# Patient Record
Sex: MALE | Race: BLACK OR AFRICAN AMERICAN | Employment: FULL TIME | URBAN - METROPOLITAN AREA
[De-identification: names, ages, dates, MRNs, and addresses within clinical notes are randomized per-mention and may not be internally consistent; named-entity substitution may affect disease eponyms.]

---

## 2017-01-11 ENCOUNTER — OFFICE VISIT (OUTPATIENT)
Dept: PRIMARY CARE CLINIC | Age: 42
End: 2017-01-11

## 2017-01-11 VITALS
SYSTOLIC BLOOD PRESSURE: 120 MMHG | DIASTOLIC BLOOD PRESSURE: 72 MMHG | HEIGHT: 73 IN | HEART RATE: 64 BPM | BODY MASS INDEX: 28.73 KG/M2 | WEIGHT: 216.8 LBS

## 2017-01-11 DIAGNOSIS — R20.0 NUMBNESS IN LEFT LEG: Primary | ICD-10-CM

## 2017-01-11 DIAGNOSIS — F17.200 TOBACCO DEPENDENCE: ICD-10-CM

## 2017-01-11 DIAGNOSIS — Z00.00 HEALTH CARE MAINTENANCE: ICD-10-CM

## 2017-01-11 PROCEDURE — 99203 OFFICE O/P NEW LOW 30 MIN: CPT | Performed by: INTERNAL MEDICINE

## 2017-01-11 ASSESSMENT — ENCOUNTER SYMPTOMS
CHEST TIGHTNESS: 0
DIARRHEA: 0
ABDOMINAL PAIN: 0
NAUSEA: 0
VOMITING: 0
SORE THROAT: 0
COUGH: 0
EYE REDNESS: 0
EYE ITCHING: 0
CONSTIPATION: 0
WHEEZING: 0
BACK PAIN: 0

## 2018-09-27 PROBLEM — Z00.00 HEALTH CARE MAINTENANCE: Status: RESOLVED | Noted: 2017-01-11 | Resolved: 2018-09-27

## 2020-08-24 ENCOUNTER — HOSPITAL ENCOUNTER (EMERGENCY)
Age: 45
Discharge: HOME | End: 2020-08-24
Attending: EMERGENCY MEDICINE

## 2020-08-24 VITALS
BODY MASS INDEX: 26.95 KG/M2 | DIASTOLIC BLOOD PRESSURE: 88 MMHG | HEIGHT: 74 IN | OXYGEN SATURATION: 99 % | RESPIRATION RATE: 12 BRPM | WEIGHT: 210 LBS | SYSTOLIC BLOOD PRESSURE: 144 MMHG | HEART RATE: 64 BPM | TEMPERATURE: 98.5 F

## 2020-08-24 PROCEDURE — 99283 EMERGENCY DEPT VISIT LOW MDM: CPT

## 2020-08-24 PROCEDURE — 6370000000 HC RX 637 (ALT 250 FOR IP): Performed by: EMERGENCY MEDICINE

## 2020-08-24 RX ORDER — DIPHENHYDRAMINE HCL 25 MG
25 CAPSULE ORAL EVERY 6 HOURS PRN
Qty: 30 CAPSULE | Refills: 0 | Status: SHIPPED | OUTPATIENT
Start: 2020-08-24 | End: 2020-09-03

## 2020-08-24 RX ORDER — PREDNISONE 20 MG/1
60 TABLET ORAL DAILY
Qty: 12 TABLET | Refills: 0 | Status: SHIPPED | OUTPATIENT
Start: 2020-08-24 | End: 2020-08-28

## 2020-08-24 RX ORDER — PREDNISONE 20 MG/1
60 TABLET ORAL ONCE
Status: COMPLETED | OUTPATIENT
Start: 2020-08-24 | End: 2020-08-24

## 2020-08-24 RX ORDER — DIPHENHYDRAMINE HCL 25 MG
25 TABLET ORAL ONCE
Status: COMPLETED | OUTPATIENT
Start: 2020-08-24 | End: 2020-08-24

## 2020-08-24 RX ADMIN — PREDNISONE 60 MG: 20 TABLET ORAL at 21:15

## 2020-08-24 RX ADMIN — DIPHENHYDRAMINE HCL 25 MG: 25 TABLET ORAL at 21:15

## 2020-08-25 NOTE — ED NOTES
Pt dc/d with instructions and rx's in stable condition, ambulatory to lobby. Home per ride.       Jaci Myers RN  08/24/20 8519

## 2020-08-25 NOTE — ED PROVIDER NOTES
of education: Not on file    Highest education level: Not on file   Occupational History    Not on file   Social Needs    Financial resource strain: Not on file    Food insecurity     Worry: Not on file     Inability: Not on file    Transportation needs     Medical: Not on file     Non-medical: Not on file   Tobacco Use    Smoking status: Current Some Day Smoker     Years: 5.00     Types: Cigars    Smokeless tobacco: Never Used    Tobacco comment: 3-4 black and mild daily   Substance and Sexual Activity    Alcohol use: Yes     Comment: 6-7 shots a day    Drug use: Yes     Types: Marijuana     Comment: Jameson Hernandez daily    Sexual activity: Not on file   Lifestyle    Physical activity     Days per week: Not on file     Minutes per session: Not on file    Stress: Not on file   Relationships    Social connections     Talks on phone: Not on file     Gets together: Not on file     Attends Rastafari service: Not on file     Active member of club or organization: Not on file     Attends meetings of clubs or organizations: Not on file     Relationship status: Not on file    Intimate partner violence     Fear of current or ex partner: Not on file     Emotionally abused: Not on file     Physically abused: Not on file     Forced sexual activity: Not on file   Other Topics Concern    Not on file   Social History Narrative    Not on file       Nursing Notes Reviewed      ROS:  General: no fever  ENT: no sinus congestion, no sore throat  RESP: no cough, no shortness of breath  GI: no abdominal pain, no vomiting, no diarrhea  Musculoskeletal: no arthralgia, no myalgia, no back pain, no joint swelling  NEURO: no headache, no numbness, no weakness  DERM: + rash, no erythema, no ecchymosis, no wounds      PHYSICAL EXAM:  GENERAL APPEARANCE: Justine Benavides is in no acute respiratory distress. Awake and alert.   VITAL SIGNS:   ED Triage Vitals [08/24/20 2037]   Enc Vitals Group      BP (!) 146/97      Pulse 64      Resp 12      Temp 98.5 °F (36.9 °C)      Temp Source Oral      SpO2 99 %      Weight 210 lb (95.3 kg)      Height 6' 2\" (1.88 m)      Head Circumference       Peak Flow       Pain Score       Pain Loc       Pain Edu? Excl. in [address]? HEAD: Normocephalic, atraumatic. EYES:  Extraocular muscles are intact. Conjunctivas are pink. Negative scleral icterus. ENT:  Mucous membranes are moist.  Pharynx without erythema or exudates. NECK: Nontender and supple. CHEST: Clear to auscultation bilaterally. No rales, rhonchi, or wheezing. HEART:  Regular rate and rhythm. No murmurs. Strong and equal pulses in the upper and lower extremities. ABDOMEN: Soft,  nondistended, positive bowel sounds. abdomen is nontender. ; christopher Gil RN. Circumsized. No urethral discharge. No glans swelling. Foreskin edematous but loose. Not indurated. No erythema or warmth. Shaft is not tender. No testicular swelling or tenderness. No exudate around foreskin  MUSCULOSKELETAL:  Active range of motion of the upper and lower extremities. No edema. NEUROLOGICAL: Awake, alert and oriented x 3. Power intact in the upper and lower extremities. DERMATOLOGIC: No petechiae, rashes, or ecchymoses. Few small bumps on arms with no erythema or vesicles or pustules      ED COURSE AND MEDICAL DECISION MAKING:        Treatment in the department:  Patient received   Medications   diphenhydrAMINE (BENADRYL) tablet 25 mg (has no administration in time range)   predniSONE (DELTASONE) tablet 60 mg (has no administration in time range)       Medical decision making:  Patient presents emergency department with possible bug bites. He is describing bumps on his arms however on exam there is no definite appreciable stings or mosquito bites. There is some irregularity to the contour of the skin but no erythema. His foreskin however is edematous. It does not look infected. There is no constriction on the glans. No vesicles or pustules.   While this could be a reaction to some sort of sting or insect bite and allergic reaction is also possible. Patient be treated with antihistamines and steroids. I estimate there is LOW risk for CELLULITIS,  NECROTIZING FASCIITIS, INFECTIOUS BALANITIS, DEEP SPACE INFECTION, WRAY JEREL SYNDROME, ANAPHYLAXIS, SEPSIS, thus I consider the discharge disposition reasonable. Obie Aragon and I have discussed the diagnosis and risks, and we agree with discharging home to follow-up with their primary doctor. We also discussed returning to the Emergency Department immediately if new or worsening symptoms occur. We have discussed the symptoms which are most concerning (e.g., changing or worsening pain, fever, numbness, weakness, cool or painful digits) that necessitate immediate return      Clinical Impression:  1. Foreskin inflammation    2. Insect bites and stings, initial encounter        Dispo:  Patient will be discharged  at this time. Patient was informed of this decision and agrees with plan. I have discussed lab and xray findings with patient and they understand. Questions were answered to the best of my ability. Discharge vitals:  Blood pressure (!) 146/97, pulse 64, temperature 98.5 °F (36.9 °C), temperature source Oral, resp. rate 12, height 6' 2\" (1.88 m), weight 210 lb (95.3 kg), SpO2 99 %. Prescriptions given:   New Prescriptions    DIPHENHYDRAMINE (BENADRYL) 25 MG CAPSULE    Take 1 capsule by mouth every 6 hours as needed for Itching or Allergies    PREDNISONE (DELTASONE) 20 MG TABLET    Take 3 tablets by mouth daily for 4 days         This chart was created using dragon voice recognition software.         Tammy Patterson MD  08/24/20 4921

## 2020-08-25 NOTE — ED NOTES
Pt to ed with c/o swelling to his penis, below glans, poss bug bite, states had poss  multiple sm bites to arms today with itching.  Exam per MD.      Nai Samano RN  08/24/20 5247

## 2021-03-11 ENCOUNTER — HOSPITAL ENCOUNTER (EMERGENCY)
Age: 46
Discharge: HOME | End: 2021-03-11
Attending: EMERGENCY MEDICINE

## 2021-03-11 VITALS
SYSTOLIC BLOOD PRESSURE: 140 MMHG | OXYGEN SATURATION: 99 % | HEART RATE: 73 BPM | RESPIRATION RATE: 14 BRPM | WEIGHT: 203 LBS | BODY MASS INDEX: 26.05 KG/M2 | TEMPERATURE: 98.2 F | HEIGHT: 74 IN | DIASTOLIC BLOOD PRESSURE: 90 MMHG

## 2021-03-11 DIAGNOSIS — L50.9 URTICARIA: Primary | ICD-10-CM

## 2021-03-11 PROCEDURE — 6370000000 HC RX 637 (ALT 250 FOR IP): Performed by: EMERGENCY MEDICINE

## 2021-03-11 PROCEDURE — 99284 EMERGENCY DEPT VISIT MOD MDM: CPT

## 2021-03-11 RX ORDER — DIPHENHYDRAMINE HCL 25 MG
25 TABLET ORAL ONCE
Status: COMPLETED | OUTPATIENT
Start: 2021-03-11 | End: 2021-03-11

## 2021-03-11 RX ORDER — DIPHENHYDRAMINE HCL 25 MG
25 CAPSULE ORAL EVERY 6 HOURS PRN
Qty: 30 CAPSULE | Refills: 0 | Status: SHIPPED | OUTPATIENT
Start: 2021-03-11 | End: 2021-03-21

## 2021-03-11 RX ORDER — PREDNISONE 20 MG/1
60 TABLET ORAL ONCE
Status: COMPLETED | OUTPATIENT
Start: 2021-03-11 | End: 2021-03-11

## 2021-03-11 RX ORDER — PREDNISONE 10 MG/1
TABLET ORAL
Qty: 30 TABLET | Refills: 0 | Status: SHIPPED | OUTPATIENT
Start: 2021-03-11 | End: 2021-03-21

## 2021-03-11 RX ADMIN — DIPHENHYDRAMINE HYDROCHLORIDE 25 MG: 25 TABLET ORAL at 17:52

## 2021-03-11 RX ADMIN — PREDNISONE 60 MG: 20 TABLET ORAL at 17:52

## 2021-03-11 ASSESSMENT — ENCOUNTER SYMPTOMS
WHEEZING: 0
SHORTNESS OF BREATH: 0
VOICE CHANGE: 0
TROUBLE SWALLOWING: 0

## 2021-03-11 NOTE — ED PROVIDER NOTES
[address] [facility]  eMERGENCY dEPARTMENT eNCOUnter      Pt Name: Collette Spoon  MRN: [id number]  [place] 1975  Date of evaluation: 3/11/2021  Provider: Chai Restrepo MD    [address] [place] [address]       Chief Complaint   Patient presents with    Allergic Reaction     hives         HISTORY OF PRESENT ILLNESS   (Location/Symptom, Timing/Onset, Context/Setting, Quality, Duration, Modifying Factors, Severity)  Note limiting factors. Collette Spoon is a 41 y.o. male he denies any known allergens who presents with 2 days of intermittent hives to his bilateral arms and torso. The patient denies any GI symptoms specifically denying nausea vomiting or diarrhea. He denies any lightheadedness or syncope. He denies any facial swelling, tongue swelling, trouble breathing, or shortness of breath. He reports that he has taken ibuprofen for the past couple days for knee pain but reports he has taken ibuprofen on multiple previous occasions without any allergic reaction. He reports that he thinks that his hives get worse when he is at work at the [employer] however he reports he has had to stop for 2 years and has not switched locations. He is unsure of any other new possible allergen exposure denies any change to laundry detergents, skin lotions, or soaps. He reports his symptoms are mild, intermittent, and wax and wane. HPI    Nursing Notes were reviewed. REVIEW OFSYSTEMS    (2-9 systems for level 4, 10 or more for level 5)     Review of Systems   Constitutional: Negative for fever. HENT: Negative for drooling, trouble swallowing and voice change. Eyes: Negative for visual disturbance. Respiratory: Negative for shortness of breath and wheezing. Cardiovascular: Negative for chest pain and palpitations. Skin: Positive for rash. Neurological: Negative for seizures and syncope. Psychiatric/Behavioral: Negative for self-injury and suicidal ideas.        Except as noted above the Detail Level: Generalized Detail Level: Zone Detail Level: Detailed to 7 for level 4, 8 or more for level 5)     ED Triage Vitals [03/11/21 1658]   BP Temp Temp src Pulse Resp SpO2 Height Weight   (!) 140/90 98.2 °F (36.8 °C) -- 73 14 99 % 6' 2\" (1.88 m) 203 lb (92.1 kg)       Physical Exam  Vitals signs and nursing note reviewed. Constitutional:       General: He is not in acute distress. Appearance: He is well-developed. HENT:      Head: Normocephalic and atraumatic. Mouth/Throat:      Comments: No angioedema or facial swelling. Patient breathing normally. Eyes:      Conjunctiva/sclera: Conjunctivae normal.   Neck:      Vascular: No JVD. Trachea: No tracheal deviation. Cardiovascular:      Rate and Rhythm: Normal rate. Pulmonary:      Effort: Pulmonary effort is normal. No respiratory distress. Breath sounds: Normal breath sounds. No stridor. Skin:     General: Skin is warm and dry. Findings: Rash (Raised urticarial rash to bilateral upper extremities and trunk. No mucous membrane involvement. No pus drainage. No red streaking.) present. Neurological:      Mental Status: He is alert. Comments: Normal speech and mental status. DIAGNOSTIC RESULTS         EMERGENCY DEPARTMENT COURSE and DIFFERENTIAL DIAGNOSIS/MDM:   Vitals:    Vitals:    03/11/21 1658   BP: (!) 140/90   Pulse: 73   Resp: 14   Temp: 98.2 °F (36.8 °C)   SpO2: 99%   Weight: 203 lb (92.1 kg)   Height: 6' 2\" (1.88 m)         MDM  Primarily suspect urticaria due to either unknown allergic reaction for a viral infection. I do not suspect anaphylaxis, Luo-Stan syndrome, sepsis, or emergent medical pathology at this time. I feel the patient is appropriate for steroid taper, Benadryl, and referral to allergist for skin testing. Strict ER return precautions given for any lightheadedness, syncope, GI symptoms, or trouble breathing. The patient expresses understanding and agreement with this plan and is discharged home.     Differential considered and deemed less Detail Level: Simple

## 2021-03-11 NOTE — ED NOTES
Patient given prescription, discharge instructions verbal and written, patient verbalized understanding. Alert/oriented X4, Clear speech.   Patient exhibits no distress, ambulates with steady gait per self leaving unit, no further request.     Edward Lewis RN  03/11/21 8077

## 2021-03-11 NOTE — ED NOTES
Patient to ed with complaints of hives which started yesterday and restarted again today, patient reports the only item different in his routine was taking ibuprofen yesterday and today for some knee pain.       Mercedes Huntley RN  03/11/21 6410

## 2021-03-18 ENCOUNTER — HOSPITAL ENCOUNTER (EMERGENCY)
Age: 46
Discharge: HOME | End: 2021-03-18
Attending: EMERGENCY MEDICINE

## 2021-03-18 VITALS
BODY MASS INDEX: 26.05 KG/M2 | WEIGHT: 203 LBS | RESPIRATION RATE: 18 BRPM | HEART RATE: 73 BPM | DIASTOLIC BLOOD PRESSURE: 74 MMHG | HEIGHT: 74 IN | OXYGEN SATURATION: 100 % | TEMPERATURE: 98.4 F | SYSTOLIC BLOOD PRESSURE: 146 MMHG

## 2021-03-18 DIAGNOSIS — R20.0 NUMBNESS AND TINGLING OF RIGHT ARM: Primary | ICD-10-CM

## 2021-03-18 DIAGNOSIS — R20.2 NUMBNESS AND TINGLING OF RIGHT ARM: Primary | ICD-10-CM

## 2021-03-18 DIAGNOSIS — R03.0 ELEVATED BLOOD PRESSURE READING: ICD-10-CM

## 2021-03-18 PROCEDURE — 99283 EMERGENCY DEPT VISIT LOW MDM: CPT

## 2021-03-18 PROCEDURE — 6370000000 HC RX 637 (ALT 250 FOR IP): Performed by: EMERGENCY MEDICINE

## 2021-03-18 RX ORDER — PREDNISONE 20 MG/1
20 TABLET ORAL DAILY
Qty: 5 TABLET | Refills: 0 | Status: SHIPPED | OUTPATIENT
Start: 2021-03-18 | End: 2021-03-23

## 2021-03-18 RX ORDER — PREDNISONE 10 MG/1
5 TABLET ORAL ONCE
Status: COMPLETED | OUTPATIENT
Start: 2021-03-18 | End: 2021-03-18

## 2021-03-18 RX ADMIN — PREDNISONE 5 MG: 10 TABLET ORAL at 22:45

## 2021-03-18 ASSESSMENT — PAIN DESCRIPTION - PAIN TYPE: TYPE: ACUTE PAIN

## 2021-03-18 ASSESSMENT — PAIN DESCRIPTION - ORIENTATION: ORIENTATION: RIGHT

## 2021-03-18 ASSESSMENT — PAIN DESCRIPTION - LOCATION: LOCATION: ARM;SHOULDER

## 2021-03-18 ASSESSMENT — PAIN DESCRIPTION - FREQUENCY: FREQUENCY: CONTINUOUS

## 2021-03-19 NOTE — ED PROVIDER NOTES
Emergency Physician Note  [address]  [address] [facility]  [address] [facility] [address]  Dept: [phone]  Dept Fax: [phone]  Loc: [phone]  Open Note Time:  9:26 PM EDT    Chief Complaint  Numbness (right arm from shoulder down and into chest ) and Tingling       History of Present Illness  Laura Molina is a 41 y.o. male  has a past medical history of Seizures ([facility] [address] who presents to the ED for numbness and tingling of the right arm. When I asked about the chest she states she does not have any numbness or tingling in her chest she just feels like his right pectoralis tightening because of the uncomfortable sensation of numbness and tingling in the right arm. Specifically denies any right chest pain or right chest pressure or right chest discomfort. Patient works at [employer] job and he has repetitive motion of his upper extremities throughout his shift. Denies fever,   chest pain, shortness of breath, cough, abdominal pain, nausea, vomiting, diarrhea, headache, sore throat, dysuria, neck pain, back pain, rash. No palliative/provocative factors. Unless otherwise stated in this report or unable to obtain because of the patient's clinical or mental status as evidenced by the medical record, this patient's positive and negative responses for review of systems, constitutional, psych, eyes, ENT, cardiovascular, respiratory, gastrointestinal, neurological, genitourinary, musculoskeletal, integument systems and systems related to the presenting problem are either stated in the preceding paragraph or were not pertinent or were negative for the symptoms and/or complaints related to the medical problem. I have reviewed the following from the nursing documentation:      Prior to Admission medications    Medication Sig Start Date End Date Taking?  Authorizing Provider   predniSONE (DELTASONE) 10 MG tablet 5 tabs po qam for 2 days then 4,3,2,1 tabs qam for 2 days each total of 10 days 3/11/21 3/21/21  Gisella Bay MD   diphenhydrAMINE (BENADRYL) 25 MG capsule Take 1 capsule by mouth every 6 hours as needed for Itching 3/11/21 3/21/21  Gisella Bay MD   ibuprofen (IBU) 600 MG tablet Take 1 tablet by mouth every 8 hours as needed for Pain 6/20/18   Regino Francis MD       Allergies as of 03/18/2021    (No Known Allergies)       Past Medical History:   Diagnosis Date    Seizures ([facility] [address]     past        Surgical History: History reviewed. No pertinent surgical history.      Family History:    Family History   Problem Relation Age of Onset    High Blood Pressure Mother     High Cholesterol Mother        Social History     Socioeconomic History    Marital status: Single     Spouse name: Not on file    Number of children: Not on file    Years of education: Not on file    Highest education level: Not on file   Occupational History    Not on file   Social Needs    Financial resource strain: Not on file    Food insecurity     Worry: Not on file     Inability: Not on file    Transportation needs     Medical: Not on file     Non-medical: Not on file   Tobacco Use    Smoking status: Current Some Day Smoker     Years: 5.00     Types: Cigars    Smokeless tobacco: Never Used    Tobacco comment: 3-4 black and mild daily   Substance and Sexual Activity    Alcohol use: Yes     Comment: 6-7 shots a day    Drug use: Not Currently     Types: Marijuana     Comment: Pamela Roque daily    Sexual activity: Not on file   Lifestyle    Physical activity     Days per week: Not on file     Minutes per session: Not on file    Stress: Not on file   Relationships    Social connections     Talks on phone: Not on file     Gets together: Not on file     Attends [employer] service: Not on file     Active member of club or organization: Not on file     Attends meetings of clubs or organizations: Not on file     Relationship status: Not on file    Intimate partner violence Fear of current or ex partner: Not on file     Emotionally abused: Not on file     Physically abused: Not on file     Forced sexual activity: Not on file   Other Topics Concern    Not on file   Social History Narrative    Not on file       Nursing notes reviewed. ED Triage Vitals   Enc Vitals Group      BP 03/18/21 2103 (!) 146/74      Pulse 03/18/21 2103 73      Resp 03/18/21 2103 18      Temp 03/18/21 2106 98.4 °F (36.9 °C)      Temp Source 03/18/21 2106 Oral      SpO2 03/18/21 2103 100 %      Weight 03/18/21 2103 203 lb (92.1 kg)      Height 03/18/21 2103 6' 2\" (1.88 m)      Head Circumference --       Peak Flow --       Pain Score --       Pain Loc --       Pain Edu? --       Excl. in GC? --        GENERAL:   Body mass index is 26.06 kg/m². Awake, alert. Well developed, well nourished with no apparent distress. Nontoxic-appearing, non-ill-appearing. HENT:   Normocephalic, Atraumatic, no lacerations. No ENT exam due to PPE. EYES:   Conjunctiva normal,   Pupils equal round and reactive to light,   Extraocular movements normal.  NECK:  Trachea is midline. No stridor. No lymphadenopathy of the anterior cervical chain  No lymphadenopathy of the posterior cervical chain. No meningeal signs, Supple without JVD. No C-spine midline tenderness. CHEST:  Regular rate and regular rhythm, no murmurs/rubs/gallops,  normal S1/S2, chest wall non-tender. LUNGS:  No respiratory distress. No abdominal retractions, no sternal retractions  Clear to auscultation bilaterally, no wheezing, no rhochi, no rales  Speaking comfortably in full sentences  ABDOMEN:  Soft, non-tender, non-distended. No guarding. No rebound. No costovertebral angle tenderness to palpation. Normal BS, no organomegaly, no abdominal masses  EXTREMITIES:  Moves extremities x4 with purpose. Normal range of motion, no edema,  No tenderness, no deformity,  distal pulses present and equal bilaterally.   BACK:  No midline tenderness in the cervical, thoracic, and lumbar spine. No deformities, no step-off. SKIN:  Warm, dry and intact. NEUROLOGIC:  Normal mental status. Moving all extremities to command. Alert and oriented x4  without focal motor deficit or gross sensory deficit. Normal speech. Strength 5/5 in bilateral upper and lower extremities. Sensation is intact in the upper and lower extremities. Cranial Nerves 3-12 are intact. MUR exam of both upper extremities are without focal neurological deficits. Cleveland Guaman's NIH Stroke Scale   Level of Consciousness:  0 - alert; keenly responsive  LOC Questions:  0 - answers both questions correctly  LOC Commands:  0 - performs both tasks correctly  Best Gaze:  0 - normal  Visual Fields:  0 - no visual loss  Facial Palsy:  0 - normal symmetric movement  Motor-Arm-Left:  0 - no drift, limb holds 90 (or 45) degrees for full 10 seconds  Motor-Leg-Left:  0 - no drift; leg holds 30 degree position for full 5 seconds  Motor-Arm-Right:  0 - no drift, limb holds 90 (or 45) degrees for full 10 seconds  Motor-Leg-Right:  0 - no drift; leg holds 30 degree position for full 5 seconds  Limb Ataxia:  0 - absent  Sensory:  0 - normal; no sensory loss  Best Language:  0 - no aphasia, normal  Dysarthria:  0 - normal  Extinction and Inattention:  0 - no abnormality  NIHSS Total:  0  PSYCHIATRIC:  Not anxious,  normal mood and affect,  Appropriate eye contact,  thoughts are linear and organized,  without delusions/hallucinations,  Not responding to internal stimuli,  responds appropriately to questions    LABS and DIAGNOSTIC RESULTS    RADIOLOGY  X-RAYS:  I have reviewed radiologic plain film image(s). ALL OTHER NON-PLAIN FILM IMAGES SUCH AS CT, ULTRASOUND AND MRI HAVE BEEN READ BY THE RADIOLOGIST. No orders to display        LABS  No results found for this visit on 03/18/21.     SCREENINGS  NIH Score     Glascow     [facility]    Heart Score       PROCEDURES    MEDICAL DECISION MAKING    Procedures/interventions/images ordered for this visit  No orders of the defined types were placed in this encounter. Medications ordered for this visit  No orders of the defined types were placed in this encounter. ED course notes for this visit       I wore N95 Envo mask with filter protection, facial shield and gloves when I evaluated the patient. I evaluated the patient in room 04/04    High Sensitivity Neuro Exam (HSNE) Spine  C1-2, 3, 4 Sensation back of head and neck: Present  C5 Deltoid (motor): Present  C6 Biceps (motor): Present  C7 Extend Wrist/Fingers: Present  C8 Flex Fingers: Present  T1 Move fingers apart: Present    Red Flags  Minor (1 Point Each)  Alcohol Abuse: +0 No  Diabetes Mellitus: +0 No  Renal Failure: +0 No  Night Pain: +0 No  3rd visit in last 20 days: +0 No    Major (3 Points Each)  IV Drug Use: +0 No  Fever without focus: +0 No  Recent/Current Systemic Infection: +0 No  Immunosuppression (can include chemotherapy, advanced cancer, severe malnutrition, chronic immunosuppressive drugs): +0 No  Recent Spinal Fracture/Procedure (if patient is also on anticoagulation [warfarin, heparin, and NOAC] strongly consider MRI): +0 No  Incontinence or retention: +0 No  Indwelling urinary catheter: +0 No    Red flag score: 0    Patient presents to ED for evaluation of right arm numbness and tingling likely secondary to either cervical radiculopathy or pinched nerve of the right upper extremity. No history of direct trauma. Neurovascular exam in the ER is unremarkable for any deficits. Patient also does not appear to have any wasted muscles or muscle atrophy of the right arm of the right hand . vitals signs have been reviewed and are stable. They are afebrile and nontoxic appearing. Pain was addressed with pain medication. HSNE Spine was without abnormal findings and Red Flag Score evaluated. Red flag score was less than or equal to 3 therefore ESR was not ordered. Further imaging was not ordered because red flag score was less than or equal to 3. I completed a structured, evidence-based clinical evaluation to screen for acute non-traumatic spinal emergencies and CVA. The patient has a normal detailed neurologic exam and a low red flag score. The evidence indicates that the patient is very low risk for an acute spinal emergency and this is consistent with my clinical intuition. The risk of further workup or hospitalization is likely higher than the likelihood of the patient having a spinal epidural abscess or other dangerous emergency spinal condition It is, therefore, in the patients best interest not to do additional emergent testing. I have discussed with the patient my clinical impression and the result of an evidence-based clinical evaluation to screen for spinal epidural abscess and other spinal emergencies, as well as the risk of further testing and hospitalization. The evidence shows that the risk for an acute spinal emergency is less than 1%. Although the risk of an acute spinal emergency has not been eliminated, the risks of further testing likely exceed the benefit, and the patient declines further emergent evaluation or hospitalization for dangerous emergency spinal conditions. This is a pleasant patient with right upper extremity numbness and tingling without any signs of muscle weakness or central origin for the source of his symptoms. There is no significant evidence of CVA,  fracture, dislocation, severe ligament injury, spinal instability, spinal cord injury,  other neurological injury, airway involvement, or other process requiring immediate medical or surgical intervention at this time. We will attempt to manage the patients pain.  It is understood that if the patient is not improving as expected or if other new symptoms or signs of concern develop, other etiologies or diagnoses may need to be considered requiring other tests, treatments, consultations, and/or admission. The diagnosis, plan, expected course, follow-up, and return precautions were discussed and all questions were answered. The patient's blood pressure was found to be elevated according to CMS/Medicare and the Affordable Care Act/[facility] criteria. Elevated blood pressure could occur because of pain or anxiety or other reasons and does not mean that they need to have their blood pressure treated or medications otherwise adjusted. However, this could also be a sign that they will need to have their blood pressure treated or medications changed. The patient was instructed to follow up closely with their personal physician to have their blood pressure rechecked. The patient was instructed to take a list of recent blood pressure readings to their next visit with their personal physician. Final Impression    1. Numbness and tingling of right arm    2. Elevated blood pressure reading        Blood pressure (!) 146/74, pulse 73, temperature 98.4 °F (36.9 °C), temperature source Oral, resp. rate 18, height 6' 2\" (1.88 m), weight 203 lb (92.1 kg), SpO2 100 %. Medication Summary  Patient was given scripts for the following medications. I counseled patient how to take these medications. New Prescriptions    PREDNISONE (DELTASONE) 20 MG TABLET    Take 1 tablet by mouth daily for 5 days       Patient had scripts modified or refilled for the following medications. I counseled patient how to take these medications. Modified Medications    No medications on file       Patient had scripts discontinues for the following medications. I counseled patient to stop taking these medications. Discontinued Medications    No medications on file         Disposition  At this point I do not feel the patient requires further work up and it is reasonable to discharge the patient.  I had a discussion with the patient and/or their surrogate regarding diagnosis, diagnostic testing results, treatment/ plan of care, and follow up. Patient and/or companions verbalized understanding of the ED workup, any relevant findings as well as any incidental findings, and the disposition and plan. There was shared decision-making between myself as well as the patient and/or their surrogate and we are all in agreement with discharge home. Howard Ivanna was an opportunity for questions and all questions were answered to the best of my ability and to the satisfaction of the patient and/or patient's family. Patient agreed to follow up as recommend for further evaluation/treatment. The patient was given strict return precautions as we discussed symptoms that would necessitate return to the ED. Patient will return to ED for new/worsening symptoms. The patient verbalized their understanding and agreement with the above plan. Please refer to AVS for further details regarding discharge instructions. Pt is in stable condition upon Discharge to home. The note was completed using Dragon voice recognition transcription. Every effort was made to ensure accuracy; however, inadvertent transcription errors may be present despite my best efforts to edit errors.     Chapito Wayne MD  [address] [facility]        Chapito Wayne MD  03/18/21 [id number]

## 2022-03-23 ENCOUNTER — HOSPITAL ENCOUNTER (EMERGENCY)
Age: 47
Discharge: HOME | End: 2022-03-23
Attending: EMERGENCY MEDICINE
Payer: COMMERCIAL

## 2022-03-23 VITALS
BODY MASS INDEX: 27.44 KG/M2 | TEMPERATURE: 98.7 F | HEART RATE: 94 BPM | SYSTOLIC BLOOD PRESSURE: 144 MMHG | OXYGEN SATURATION: 97 % | WEIGHT: 213.8 LBS | RESPIRATION RATE: 12 BRPM | HEIGHT: 74 IN | DIASTOLIC BLOOD PRESSURE: 72 MMHG

## 2022-03-23 DIAGNOSIS — B35.3 TINEA PEDIS OF BOTH FEET: ICD-10-CM

## 2022-03-23 DIAGNOSIS — L02.619 ABSCESS OF FOOT: Primary | ICD-10-CM

## 2022-03-23 PROCEDURE — 87070 CULTURE OTHR SPECIMN AEROBIC: CPT

## 2022-03-23 PROCEDURE — 87075 CULTR BACTERIA EXCEPT BLOOD: CPT

## 2022-03-23 PROCEDURE — 87077 CULTURE AEROBIC IDENTIFY: CPT

## 2022-03-23 PROCEDURE — 99284 EMERGENCY DEPT VISIT MOD MDM: CPT

## 2022-03-23 PROCEDURE — 10060 I&D ABSCESS SIMPLE/SINGLE: CPT

## 2022-03-23 PROCEDURE — 90471 IMMUNIZATION ADMIN: CPT | Performed by: EMERGENCY MEDICINE

## 2022-03-23 PROCEDURE — 90715 TDAP VACCINE 7 YRS/> IM: CPT | Performed by: EMERGENCY MEDICINE

## 2022-03-23 PROCEDURE — 87205 SMEAR GRAM STAIN: CPT

## 2022-03-23 PROCEDURE — 6370000000 HC RX 637 (ALT 250 FOR IP): Performed by: EMERGENCY MEDICINE

## 2022-03-23 PROCEDURE — 6360000002 HC RX W HCPCS: Performed by: EMERGENCY MEDICINE

## 2022-03-23 RX ORDER — AMOXICILLIN AND CLAVULANATE POTASSIUM 875; 125 MG/1; MG/1
1 TABLET, FILM COATED ORAL ONCE
Status: COMPLETED | OUTPATIENT
Start: 2022-03-23 | End: 2022-03-23

## 2022-03-23 RX ORDER — SULFAMETHOXAZOLE AND TRIMETHOPRIM 800; 160 MG/1; MG/1
1 TABLET ORAL ONCE
Status: COMPLETED | OUTPATIENT
Start: 2022-03-23 | End: 2022-03-23

## 2022-03-23 RX ORDER — SULFAMETHOXAZOLE AND TRIMETHOPRIM 800; 160 MG/1; MG/1
1 TABLET ORAL 2 TIMES DAILY
Qty: 20 TABLET | Refills: 0 | Status: SHIPPED | OUTPATIENT
Start: 2022-03-23 | End: 2022-04-02

## 2022-03-23 RX ORDER — LIDOCAINE HYDROCHLORIDE AND EPINEPHRINE 10; 10 MG/ML; UG/ML
20 INJECTION, SOLUTION INFILTRATION; PERINEURAL ONCE
Status: DISCONTINUED | OUTPATIENT
Start: 2022-03-23 | End: 2022-03-23 | Stop reason: HOSPADM

## 2022-03-23 RX ORDER — IBUPROFEN 600 MG/1
600 TABLET ORAL EVERY 8 HOURS PRN
Qty: 20 TABLET | Refills: 0 | Status: SHIPPED | OUTPATIENT
Start: 2022-03-23

## 2022-03-23 RX ORDER — BACITRACIN, NEOMYCIN, POLYMYXIN B 400; 3.5; 5 [USP'U]/G; MG/G; [USP'U]/G
OINTMENT TOPICAL ONCE
Status: COMPLETED | OUTPATIENT
Start: 2022-03-23 | End: 2022-03-23

## 2022-03-23 RX ORDER — AMOXICILLIN AND CLAVULANATE POTASSIUM 875; 125 MG/1; MG/1
1 TABLET, FILM COATED ORAL 2 TIMES DAILY
Qty: 20 TABLET | Refills: 0 | Status: SHIPPED | OUTPATIENT
Start: 2022-03-23 | End: 2022-04-02

## 2022-03-23 RX ADMIN — TETANUS TOXOID, REDUCED DIPHTHERIA TOXOID AND ACELLULAR PERTUSSIS VACCINE, ADSORBED 0.5 ML: 5; 2.5; 8; 8; 2.5 SUSPENSION INTRAMUSCULAR at 19:22

## 2022-03-23 RX ADMIN — BACITRACIN, NEOMYCIN, POLYMYXIN B 1 G: 400; 3.5; 5 OINTMENT TOPICAL at 19:31

## 2022-03-23 RX ADMIN — AMOXICILLIN AND CLAVULANATE POTASSIUM 1 TABLET: 875; 125 TABLET, FILM COATED ORAL at 19:22

## 2022-03-23 RX ADMIN — SULFAMETHOXAZOLE AND TRIMETHOPRIM 1 TABLET: 800; 160 TABLET ORAL at 19:22

## 2022-03-23 ASSESSMENT — PAIN SCALES - GENERAL: PAINLEVEL_OUTOF10: 8

## 2022-03-23 ASSESSMENT — PAIN DESCRIPTION - ORIENTATION: ORIENTATION: RIGHT

## 2022-03-23 ASSESSMENT — PAIN DESCRIPTION - LOCATION: LOCATION: FOOT

## 2022-03-23 ASSESSMENT — PAIN DESCRIPTION - FREQUENCY: FREQUENCY: CONTINUOUS

## 2022-03-23 ASSESSMENT — PAIN DESCRIPTION - DESCRIPTORS: DESCRIPTORS: ACHING

## 2022-03-23 ASSESSMENT — PAIN DESCRIPTION - PAIN TYPE: TYPE: ACUTE PAIN

## 2022-03-23 NOTE — ED NOTES
Patient to ed with complaints of a right foot swelling near his 4th and right small toe which started 1 week ago and worsened.      Cornel Childers, RN  03/23/22 [id number]

## 2022-03-23 NOTE — ED NOTES
Pt dc/d with instructions and work note in stable condition, ambulatory to lobby. Home per ride.      Juan Jose Orr RN  03/23/22 [id number]

## 2022-03-23 NOTE — Clinical Note
Sushant Calderón was seen and treated in our emergency department on 3/23/2022. He may return to work on 03/28/2022. If you have any questions or concerns, please don't hesitate to call.       Alvaro Villalobos MD

## 2022-03-24 NOTE — ED PROVIDER NOTES
TRIAGE CHIEF COMPLAINT:   Chief Complaint   Patient presents with    Foot Pain     right small and 4th toe         HPI: Hadley Sanchez is a 40 y.o. male who presents to the Emergency Department with complaint of right foot localized swelling and pain for about a week. He states the area is getting more swollen. There is been no drainage. He points to the distal lateral foot between the fourth and fifth toes. Patient has not had this problem in the past.  Denies fever or chills. He denies history of diabetes. REVIEW OF SYSTEMS:  6 systems reviewed. Pertinent positives per HPI. Otherwise noted to be negative. Nursing notes reviewed and agree with above. Past medical/surgical history reviewed. MEDICATIONS   Discharge Medication List as of 3/23/2022  7:28 PM      START taking these medications    Details   amoxicillin-clavulanate (AUGMENTIN) 875-125 MG per tablet Take 1 tablet by mouth 2 times daily for 10 days, Disp-20 tablet, R-0Normal      sulfamethoxazole-trimethoprim (BACTRIM DS) 800-160 MG per tablet Take 1 tablet by mouth 2 times daily for 10 days, Disp-20 tablet, R-0Normal      ciclopirox (LOPROX) 0.77 % cream Apply topically between your toes 2 times daily for 2 to 3 weeks, Disp-30 g, R-1, Normal         CONTINUE these medications which have CHANGED    Details   ibuprofen (IBU) 600 MG tablet Take 1 tablet by mouth every 8 hours as needed for Pain or Fever (with food), Disp-20 tablet, R-0Normal               ALLERGIES No Known Allergies      BP (!) 144/72   Pulse 94   Temp 98.7 °F (37.1 °C) (Oral)   Resp 12   Ht 6' 2\" (1.88 m)   Wt 213 lb 12.8 oz (97 kg)   SpO2 97%   BMI 27.45 kg/m²   General:  No acute distress. Non toxic appearance  Head:   Normocephalic and atraumatic  Eyes:   Conjunctiva clear, JAVIER, EOM's intact. Sclera anicteric. ENT:   Mucous membranes moist  Neck:   Supple. No adenopathy.   Lungs/Chest:  No respiratory distress  CVS:   Regular rate and rhythm  Extremities:  Full range of motion. There is a 2 x 3 cm abscess on the distal lateral foot dorsum which starts between the fourth and fifth toes and extends slightly proximally. It is fluctuant but no drainage noted. There is no lymphangitic streaking. Pedal pulses are 3+ and equal.  Both feet are warm. He has diffuse rash between all toes consistent with tinea pedis but this is primarily present on the right foot between the fourth and fifth toes. Skin:   No rashes or lesions to exposed skin  Neuro:  Alert and OX3. Speech clear and appropriate. No extremity weakness. Normal sensation in all extremities. No facial asymmetry. Gait normal.  Psych:   Affect normal. Mood normal        RADIOLOGY:      LAB      PROCEDURES:   The patient verbally consented to incision and drainage of the abscess. It was cleaned with chlorhexidine and saline. It was gently unroofed and the nonviable skin removed. Exploration showed no deeper tissue involvement or foreign body. It was irrigated with saline and then a dressing applied with Polysporin and gauze. ED COURSE / MDM:  45-year-old male, not diabetic, with 1 week history of pain and swelling of the distal lateral right foot presents with evidence of diffuse tinea pedis and an abscess that starts between the fourth and fifth toes and extends slightly onto the dorsal aspect of the foot. Abscess was unroofed and drained. Wound culture was sent and is pending. He was started on Augmentin and Bactrim. Given ibuprofen for pain and ciclopirox cream for tinea pedis. I recommended follow-up with the [facility] wound clinic. Recommended rest and elevation. He was placed off work until Monday. He was given wound care instructions. I discussed with Mary Rapp the results of evaluation in the Emergency Department, diagnosis, care and prognosis. The plan is to discharge to home. The patient is in agreement with the plan and questions have been answered.  I also discussed with the patient and/or family the reasons which may require a return visit and the importance of follow-up care.        (Please note that portions of this note may have been completed with a voice recognition program.  Efforts were made to edit the dictation but occasionally words are mis-transcribed)        FINAL IMPRESSION:  1 --abscess of right foot, incision and drainage  2 --tinea pedis            Ana Salguero MD  03/24/22 [id number]

## 2022-03-29 LAB
ANAEROBIC CULTURE: ABNORMAL
GRAM STAIN RESULT: ABNORMAL
ORGANISM: ABNORMAL
ORGANISM: ABNORMAL
WOUND/ABSCESS: ABNORMAL
WOUND/ABSCESS: ABNORMAL